# Patient Record
Sex: MALE | Race: ASIAN | Employment: FULL TIME | URBAN - METROPOLITAN AREA
[De-identification: names, ages, dates, MRNs, and addresses within clinical notes are randomized per-mention and may not be internally consistent; named-entity substitution may affect disease eponyms.]

---

## 2021-09-02 ENCOUNTER — OFFICE VISIT (OUTPATIENT)
Dept: PRIMARY CARE CLINIC | Age: 36
End: 2021-09-02

## 2021-09-02 VITALS
TEMPERATURE: 97.3 F | OXYGEN SATURATION: 98 % | WEIGHT: 149.8 LBS | HEART RATE: 71 BPM | RESPIRATION RATE: 18 BRPM | BODY MASS INDEX: 23.51 KG/M2 | DIASTOLIC BLOOD PRESSURE: 80 MMHG | SYSTOLIC BLOOD PRESSURE: 121 MMHG | HEIGHT: 67 IN

## 2021-09-02 NOTE — PROGRESS NOTES
Chief Complaint   Patient presents with   Donis Odell Establish Care    Attention Deficit Disorder     medication refill     Other     Referral to dermatologist to remove mole left side of neck         Visit Vitals  /80 (BP 1 Location: Left upper arm, BP Patient Position: Sitting)   Pulse 71   Temp 97.3 °F (36.3 °C) (Temporal)   Resp 18   Ht 5' 7.32\" (1.71 m)   Wt 149 lb 12.8 oz (67.9 kg)   SpO2 98%   BMI 23.24 kg/m²

## 2021-09-02 NOTE — PROGRESS NOTES
Pt wanted ADHD med refilled  Informed pt that I do not manage ADHD. Pt can reschedule with any provder in the office.   Asked pt to continu seeing his previosu PCP on [facility]

## 2021-09-27 ENCOUNTER — OFFICE VISIT (OUTPATIENT)
Dept: PRIMARY CARE CLINIC | Age: 36
End: 2021-09-27

## 2021-09-27 VITALS
TEMPERATURE: 99.6 F | HEART RATE: 80 BPM | RESPIRATION RATE: 18 BRPM | WEIGHT: 153.8 LBS | SYSTOLIC BLOOD PRESSURE: 133 MMHG | OXYGEN SATURATION: 98 % | DIASTOLIC BLOOD PRESSURE: 89 MMHG | HEIGHT: 68 IN | BODY MASS INDEX: 23.31 KG/M2

## 2021-09-27 RX ORDER — DEXTROAMPHETAMINE SACCHARATE, AMPHETAMINE ASPARTATE MONOHYDRATE, DEXTROAMPHETAMINE SULFATE AND AMPHETAMINE SULFATE 2.5; 2.5; 2.5; 2.5 MG/1; MG/1; MG/1; MG/1
10 CAPSULE, EXTENDED RELEASE ORAL
COMMUNITY

## 2021-09-27 NOTE — PROGRESS NOTES
Chief Complaint   Patient presents with   [address] [facility] Patient      Reviewed the chart and obtain necessary information for visit. 1. Have you been to the ER, urgent care clinic since your last visit? Hospitalized since your last visit? No    2. Have you seen or consulted any other health care providers outside of the [address] [place] [address] since your last visit? Include any pap smears or colon screening.  No   Visit Vitals  /89 (BP 1 Location: Right arm, BP Patient Position: Sitting)   Pulse 80   Temp 99.6 °F (37.6 °C) (Temporal)   Resp 18   Ht 5' 7.5\" (1.715 m)   Wt 153 lb 12.8 oz (69.8 kg)   SpO2 98%   BMI 23.73 kg/m²

## 2021-09-27 NOTE — PROGRESS NOTES
Patient brought in a printed out copy of AFP Questionnaire for diagnostic criteria of ADHD with \"Y\" or \"N\" marked next to them. Discussed this would not pass for ADHD testing and we would need additional notes from psychologist of what formal testing he did. He states the testing was done over the phone and he did not do anything on a computer or in person. He admits to using marijuana daily. I discussed we do not prescribe controlled substances for those using marijuana or who cannot pass a urine drug screen. I also discussed that at this time I am not taking on any new ADHD patients who are not already established with our office. He stated he did not want to be seen then. I offered a list of psychiatrists or recommended he go back to his provider at [facility] who initially prescribed his medication. He mentioned he had other medical issues. I said I would be happy to help him with his other medical issues I just could not prescribe his ADHD meds at this time. He stated he had an episode of syncope 2 weeks ago where he fell and his hit his chin after feeling dizzy. Denies any neuro symptoms since then. I discussed syncopal work up including labs, EKG, CT head but he declined any assessment or treatment saying if I could not prescribe his ADHD medication he did not want this to be assessed or treated by me. He left the office without being examined. I did discuss if he has another episode he needs to go to the ER.

## 2023-05-15 RX ORDER — DEXTROAMPHETAMINE SACCHARATE, AMPHETAMINE ASPARTATE MONOHYDRATE, DEXTROAMPHETAMINE SULFATE AND AMPHETAMINE SULFATE 2.5; 2.5; 2.5; 2.5 MG/1; MG/1; MG/1; MG/1
10 CAPSULE, EXTENDED RELEASE ORAL
COMMUNITY